# Patient Record
Sex: MALE | Race: BLACK OR AFRICAN AMERICAN | NOT HISPANIC OR LATINO | URBAN - METROPOLITAN AREA
[De-identification: names, ages, dates, MRNs, and addresses within clinical notes are randomized per-mention and may not be internally consistent; named-entity substitution may affect disease eponyms.]

---

## 2023-09-11 ENCOUNTER — EMERGENCY (EMERGENCY)
Facility: HOSPITAL | Age: 48
LOS: 1 days | Discharge: HOME | End: 2023-09-11
Attending: EMERGENCY MEDICINE | Admitting: EMERGENCY MEDICINE
Payer: COMMERCIAL

## 2023-09-11 VITALS
HEART RATE: 69 BPM | WEIGHT: 190.04 LBS | SYSTOLIC BLOOD PRESSURE: 109 MMHG | DIASTOLIC BLOOD PRESSURE: 71 MMHG | RESPIRATION RATE: 14 BRPM | HEIGHT: 65 IN | OXYGEN SATURATION: 96 % | TEMPERATURE: 100 F

## 2023-09-11 LAB
ALBUMIN SERPL ELPH-MCNC: 3.8 G/DL — SIGNIFICANT CHANGE UP (ref 3.3–5)
ALP SERPL-CCNC: 46 U/L — SIGNIFICANT CHANGE UP (ref 30–120)
ALT FLD-CCNC: 93 U/L — HIGH (ref 10–60)
ANION GAP SERPL CALC-SCNC: 10 MMOL/L — SIGNIFICANT CHANGE UP (ref 5–17)
APPEARANCE UR: CLEAR — SIGNIFICANT CHANGE UP
AST SERPL-CCNC: 92 U/L — HIGH (ref 10–40)
BASOPHILS # BLD AUTO: 0 K/UL — SIGNIFICANT CHANGE UP (ref 0–0.2)
BASOPHILS NFR BLD AUTO: 0 % — SIGNIFICANT CHANGE UP (ref 0–2)
BILIRUB SERPL-MCNC: 0.6 MG/DL — SIGNIFICANT CHANGE UP (ref 0.2–1.2)
BILIRUB UR-MCNC: NEGATIVE — SIGNIFICANT CHANGE UP
BUN SERPL-MCNC: 15 MG/DL — SIGNIFICANT CHANGE UP (ref 7–23)
CALCIUM SERPL-MCNC: 9.4 MG/DL — SIGNIFICANT CHANGE UP (ref 8.4–10.5)
CHLORIDE SERPL-SCNC: 101 MMOL/L — SIGNIFICANT CHANGE UP (ref 96–108)
CO2 SERPL-SCNC: 27 MMOL/L — SIGNIFICANT CHANGE UP (ref 22–31)
COLOR SPEC: YELLOW — SIGNIFICANT CHANGE UP
CREAT SERPL-MCNC: 1.33 MG/DL — HIGH (ref 0.5–1.3)
DIFF PNL FLD: NEGATIVE — SIGNIFICANT CHANGE UP
EGFR: 67 ML/MIN/1.73M2 — SIGNIFICANT CHANGE UP
EOSINOPHIL # BLD AUTO: 0 K/UL — SIGNIFICANT CHANGE UP (ref 0–0.5)
EOSINOPHIL NFR BLD AUTO: 0 % — SIGNIFICANT CHANGE UP (ref 0–6)
FLUAV AG NPH QL: SIGNIFICANT CHANGE UP
FLUBV AG NPH QL: SIGNIFICANT CHANGE UP
GLUCOSE SERPL-MCNC: 110 MG/DL — HIGH (ref 70–99)
GLUCOSE UR QL: NEGATIVE MG/DL — SIGNIFICANT CHANGE UP
HCT VFR BLD CALC: 45 % — SIGNIFICANT CHANGE UP (ref 39–50)
HGB BLD-MCNC: 14.3 G/DL — SIGNIFICANT CHANGE UP (ref 13–17)
KETONES UR-MCNC: 15 MG/DL
LEUKOCYTE ESTERASE UR-ACNC: NEGATIVE — SIGNIFICANT CHANGE UP
LIDOCAIN IGE QN: 30 U/L — SIGNIFICANT CHANGE UP (ref 16–77)
LYMPHOCYTES # BLD AUTO: 0.4 K/UL — LOW (ref 1–3.3)
LYMPHOCYTES # BLD AUTO: 16 % — SIGNIFICANT CHANGE UP (ref 13–44)
MANUAL SMEAR VERIFICATION: SIGNIFICANT CHANGE UP
MCHC RBC-ENTMCNC: 28.2 PG — SIGNIFICANT CHANGE UP (ref 27–34)
MCHC RBC-ENTMCNC: 31.8 GM/DL — LOW (ref 32–36)
MCV RBC AUTO: 88.8 FL — SIGNIFICANT CHANGE UP (ref 80–100)
MONOCYTES # BLD AUTO: 0.42 K/UL — SIGNIFICANT CHANGE UP (ref 0–0.9)
MONOCYTES NFR BLD AUTO: 17 % — HIGH (ref 2–14)
NEUTROPHILS # BLD AUTO: 1.66 K/UL — LOW (ref 1.8–7.4)
NEUTROPHILS NFR BLD AUTO: 67 % — SIGNIFICANT CHANGE UP (ref 43–77)
NITRITE UR-MCNC: NEGATIVE — SIGNIFICANT CHANGE UP
NRBC # BLD: 0 /100 — SIGNIFICANT CHANGE UP (ref 0–0)
NRBC # BLD: SIGNIFICANT CHANGE UP /100 WBCS (ref 0–0)
PH UR: 5 — SIGNIFICANT CHANGE UP (ref 5–8)
PLAT MORPH BLD: NORMAL — SIGNIFICANT CHANGE UP
PLATELET # BLD AUTO: 93 K/UL — LOW (ref 150–400)
PLATELET COUNT - ESTIMATE: ABNORMAL
POTASSIUM SERPL-MCNC: 3.8 MMOL/L — SIGNIFICANT CHANGE UP (ref 3.5–5.3)
POTASSIUM SERPL-SCNC: 3.8 MMOL/L — SIGNIFICANT CHANGE UP (ref 3.5–5.3)
PROT SERPL-MCNC: 7.8 G/DL — SIGNIFICANT CHANGE UP (ref 6–8.3)
PROT UR-MCNC: SIGNIFICANT CHANGE UP MG/DL
RBC # BLD: 5.07 M/UL — SIGNIFICANT CHANGE UP (ref 4.2–5.8)
RBC # FLD: 13.2 % — SIGNIFICANT CHANGE UP (ref 10.3–14.5)
RBC BLD AUTO: NORMAL — SIGNIFICANT CHANGE UP
RSV RNA NPH QL NAA+NON-PROBE: SIGNIFICANT CHANGE UP
SARS-COV-2 RNA SPEC QL NAA+PROBE: SIGNIFICANT CHANGE UP
SODIUM SERPL-SCNC: 138 MMOL/L — SIGNIFICANT CHANGE UP (ref 135–145)
SP GR SPEC: 1.1 — HIGH (ref 1–1.03)
TROPONIN I, HIGH SENSITIVITY RESULT: 11.3 NG/L — SIGNIFICANT CHANGE UP
UROBILINOGEN FLD QL: 1 MG/DL — SIGNIFICANT CHANGE UP (ref 0.2–1)
WBC # BLD: 2.48 K/UL — LOW (ref 3.8–10.5)
WBC # FLD AUTO: 2.48 K/UL — LOW (ref 3.8–10.5)

## 2023-09-11 PROCEDURE — 85025 COMPLETE CBC W/AUTO DIFF WBC: CPT

## 2023-09-11 PROCEDURE — 74177 CT ABD & PELVIS W/CONTRAST: CPT | Mod: 26,MA

## 2023-09-11 PROCEDURE — 99285 EMERGENCY DEPT VISIT HI MDM: CPT

## 2023-09-11 PROCEDURE — 93010 ELECTROCARDIOGRAM REPORT: CPT

## 2023-09-11 PROCEDURE — 93971 EXTREMITY STUDY: CPT | Mod: 26,LT

## 2023-09-11 PROCEDURE — 81003 URINALYSIS AUTO W/O SCOPE: CPT

## 2023-09-11 PROCEDURE — 93005 ELECTROCARDIOGRAM TRACING: CPT

## 2023-09-11 PROCEDURE — 74177 CT ABD & PELVIS W/CONTRAST: CPT | Mod: MA

## 2023-09-11 PROCEDURE — 87086 URINE CULTURE/COLONY COUNT: CPT

## 2023-09-11 PROCEDURE — 99285 EMERGENCY DEPT VISIT HI MDM: CPT | Mod: 25

## 2023-09-11 PROCEDURE — 84484 ASSAY OF TROPONIN QUANT: CPT

## 2023-09-11 PROCEDURE — 93971 EXTREMITY STUDY: CPT

## 2023-09-11 PROCEDURE — 87637 SARSCOV2&INF A&B&RSV AMP PRB: CPT

## 2023-09-11 PROCEDURE — 80053 COMPREHEN METABOLIC PANEL: CPT

## 2023-09-11 PROCEDURE — 36415 COLL VENOUS BLD VENIPUNCTURE: CPT

## 2023-09-11 PROCEDURE — 83690 ASSAY OF LIPASE: CPT

## 2023-09-11 PROCEDURE — 96361 HYDRATE IV INFUSION ADD-ON: CPT

## 2023-09-11 PROCEDURE — 96374 THER/PROPH/DIAG INJ IV PUSH: CPT | Mod: XU

## 2023-09-11 RX ORDER — SODIUM CHLORIDE 9 MG/ML
1000 INJECTION INTRAMUSCULAR; INTRAVENOUS; SUBCUTANEOUS ONCE
Refills: 0 | Status: COMPLETED | OUTPATIENT
Start: 2023-09-11 | End: 2023-09-11

## 2023-09-11 RX ORDER — KETOROLAC TROMETHAMINE 30 MG/ML
30 SYRINGE (ML) INJECTION ONCE
Refills: 0 | Status: DISCONTINUED | OUTPATIENT
Start: 2023-09-11 | End: 2023-09-11

## 2023-09-11 RX ORDER — ONDANSETRON 8 MG/1
4 TABLET, FILM COATED ORAL ONCE
Refills: 0 | Status: COMPLETED | OUTPATIENT
Start: 2023-09-11 | End: 2023-09-11

## 2023-09-11 RX ADMIN — Medication 30 MILLIGRAM(S): at 12:40

## 2023-09-11 RX ADMIN — SODIUM CHLORIDE 1000 MILLILITER(S): 9 INJECTION INTRAMUSCULAR; INTRAVENOUS; SUBCUTANEOUS at 12:25

## 2023-09-11 RX ADMIN — SODIUM CHLORIDE 1000 MILLILITER(S): 9 INJECTION INTRAMUSCULAR; INTRAVENOUS; SUBCUTANEOUS at 13:00

## 2023-09-11 RX ADMIN — SODIUM CHLORIDE 1000 MILLILITER(S): 9 INJECTION INTRAMUSCULAR; INTRAVENOUS; SUBCUTANEOUS at 12:00

## 2023-09-11 RX ADMIN — Medication 30 MILLIGRAM(S): at 12:25

## 2023-09-11 RX ADMIN — SODIUM CHLORIDE 1000 MILLILITER(S): 9 INJECTION INTRAMUSCULAR; INTRAVENOUS; SUBCUTANEOUS at 13:25

## 2023-09-11 NOTE — ED ADULT NURSE NOTE - NSFALLUNIVINTERV_ED_ALL_ED
Bed/Stretcher in lowest position, wheels locked, appropriate side rails in place/Call bell, personal items and telephone in reach/Instruct patient to call for assistance before getting out of bed/chair/stretcher/Non-slip footwear applied when patient is off stretcher/[place] to call system/Physically safe environment - no spills, clutter or unnecessary equipment/Purposeful proactive rounding/Room/bathroom lighting operational, light cord in reach

## 2023-09-11 NOTE — ED PROVIDER NOTE - DIFFERENTIAL DIAGNOSIS
Differential including but not limited to flu COVID or other respiratory virus gastritis colitis enteritis diverticulitis electrolyte abnormality dehydration Differential Diagnosis

## 2023-09-11 NOTE — ED PROVIDER NOTE - CLINICAL SUMMARY MEDICAL DECISION MAKING FREE TEXT BOX
Patient complaining of body aches headache abdominal pain nausea and diarrhea which began 2 days ago he was traveling in [place].  Patient denies fevers chills chest pain short breath cough vomiting urinary complaints.  Patient reports left calf pain.    Plan EKG CT abdomen pelvis left lower extremity Doppler IV fluids Toradol Zofran    Differential including but not limited to flu COVID or other respiratory virus gastritis colitis enteritis diverticulitis electrolyte abnormality dehydration

## 2023-09-11 NOTE — ED PROVIDER NOTE - PATIENT PORTAL LINK FT
You can access the FollowMyHealth Patient Portal offered by [facility] by registering at the following website: [email or web address]. By joining [employer]’s FollowMyHealth portal, you will also be able to view your health information using other applications (apps) compatible with our system.

## 2023-09-11 NOTE — ED ADULT NURSE NOTE - OBJECTIVE STATEMENT
49yo male walked into ED, pt c/o full body aches and abd pain "it all started while I was in [place]". pt denies fever, SOB.

## 2023-09-11 NOTE — ED PROVIDER NOTE - OBJECTIVE STATEMENT
Patient complaining of body aches headache abdominal pain nausea and diarrhea which began 2 days ago he was traveling in [place].  Patient denies fevers chills chest pain short breath cough vomiting urinary complaints.  Patient reports left calf pain. Patient complaining of body aches headache abdominal pain nausea and diarrhea which began 2 days ago he was traveling in [place].  Patient denies fevers chills chest pain short breath cough vomiting urinary complaints.  Patient reports left calf pain.  pmd forgot anme

## 2023-09-11 NOTE — ED PROVIDER NOTE - NSFOLLOWUPINSTRUCTIONS_ED_ALL_ED_FT
Abdominal Pain, Adult  Pain in the abdomen (abdominal pain) can be caused by many things. Often, abdominal pain is not serious and it gets better with no treatment or by being treated at home. However, sometimes abdominal pain is serious.    Your health care provider will ask questions about your medical history and do a physical exam to try to determine the cause of your abdominal pain.    Follow these instructions at home:  Medicines    Take over-the-counter and prescription medicines only as told by your health care provider.  Do not take a laxative unless told by your health care provider.  General instructions      Watch your condition for any changes.  Drink enough fluid to keep your urine pale yellow.  Keep all follow-up visits as told by your health care provider. This is important.  Contact a health care provider if:  Your abdominal pain changes or gets worse.  You are not hungry or you lose weight without trying.  You are constipated or have diarrhea for more than 2–3 days.  You have pain when you urinate or have a bowel movement.  Your abdominal pain wakes you up at night.  Your pain gets worse with meals, after eating, or with certain foods.  You are vomiting and cannot keep anything down.  You have a fever.  You have blood in your urine.  Get help right away if:  Your pain does not go away as soon as your health care provider told you to expect.  You cannot stop vomiting.  Your pain is only in areas of the abdomen, such as the right side or the left lower portion of the abdomen. Pain on the right side could be caused by appendicitis.  You have bloody or black stools, or stools that look like tar.  You have severe pain, cramping, or bloating in your abdomen.  You have signs of dehydration, such as:  Dark urine, very little urine, or no urine.  Cracked lips.  Dry mouth.  Sunken eyes.  Sleepiness.  Weakness.  You have trouble breathing or chest pain.  Summary  Often, abdominal pain is not serious and it gets better with no treatment or by being treated at home. However, sometimes abdominal pain is serious.  Watch your condition for any changes.  Take over-the-counter and prescription medicines only as told by your health care provider.  Contact a health care provider if your abdominal pain changes or gets worse.  Get help right away if you have severe pain, cramping, or bloating in your abdomen.  This information is not intended to replace advice given to you by your health care provider. Make sure you discuss any questions you have with your health care provider.    Muscle Pain, Adult  Muscle pain, also called myalgia, is a condition in which a person has pain in one or more muscles in the body. Muscle pain may be mild, moderate, or severe. It may feel sharp, achy, or burning. In most cases, the pain lasts only a short time and goes away without treatment.    Muscle pain can result from using muscles in a new or different way or after a period of inactivity. It is normal to feel some muscle pain after starting an exercise program. Muscles that have not been used often will be sore at first.    What are the causes?  This condition is caused by using muscles in a new or different way after a period of inactivity. Other causes may include:  Overuse or muscle strain, especially if you are not in shape. This is the most common cause of muscle pain.  Injury or bruising.  Infectious diseases, including diseases caused by viruses, such as the flu (influenza).  Fibromyalgia.This is a long-term, or chronic, condition that causes muscle tenderness, tiredness (fatigue), and headache.  Autoimmune or rheumatologic diseases. These are conditions, such as lupus, in which the body's defense system (immunesystem) attacks areas in the body.  Certain medicines, including ACE inhibitors and statins.  What are the signs or symptoms?  The main symptom of this condition is sore or painful muscles, including during activity and when stretching. You may also have slight swelling.    How is this diagnosed?  This condition is diagnosed with a physical exam. Your health care provider will ask questions about your pain and when it began. If you have not had muscle pain for very long, your health care provider may want to wait before doing much testing. If your muscle pain has lasted a long time, tests may be done right away. In some cases, this may include tests to rule out certain conditions or illnesses.    How is this treated?  Treatment for this condition depends on the cause. Home care is often enough to relieve muscle pain. Your health care provider may also prescribe NSAIDs, such as ibuprofen.    Follow these instructions at home:  Medicines    Take over-the-counter and prescription medicines only as told by your health care provider.  Ask your health care provider if the medicine prescribed to you requires you to avoid driving or using machinery.  Managing pain, swelling, and discomfort    Bag of ice on a towel on the skin.  A heating pad for use on the affected area.  If directed, put ice on the painful area. To do this:  Put ice in a plastic bag.  Place a towel between your skin and the bag.  Leave the ice on for 20 minutes, 2–3 times a day.  For the first 2 days of muscle soreness, or if there is swelling:  Do not soak in hot baths.  Do not use a hot tub, steam room, sauna, heating pad, or other heat source.  After 48–72 hours, you may alternate between applying ice and applying heat as told by your health care provider. If directed, apply heat to the affected area as often as told by your health care provider. Use the heat source that your health care provider recommends, such as a moist heat pack or a heating pad.  Place a towel between your skin and the heat source.  Leave the heat on for 20–30 minutes.  Remove the heat if your skin turns bright red. This is especially important if you are unable to feel pain, heat, or cold. You may have a greater risk of getting burned.  If you have an injury, raise (elevate) the injured area above the level of your heart while you are sitting or lying down.  Activity    A person standing with arms stretched straight out in front and then squatting while keeping the knees over the toes.  If overuse is causing your muscle pain:  Slow down your activities until the pain goes away.  Do regular, gentle exercises if you are not usually active.  Warm up before exercising. Stretch before and after exercising. This can help lower the risk of muscle pain.  Do not continue working out if the pain is severe. Severe pain could mean that you have injured a muscle.  Do not lift anything that is heavier than 5–10 lb (2.3–4.5 kg), or the limit that you are told, until your health care provider says that it is safe.  Return to your normal activities as told by your health care provider. Ask your health care provider what activities are safe for you.  General instructions    Do not use any products that contain nicotine or tobacco, such as cigarettes, e-cigarettes, and chewing tobacco. These can delay healing. If you need help quitting, ask your health care provider.  Keep all follow-up visits as told by your health care provider. This is important.  Contact a health care provider if you have:  Muscle pain that gets worse and medicines do not help.  Muscle pain that lasts longer than 3 days.  A rash or fever along with muscle pain.  Muscle pain after a tick bite.  Muscle pain while working out, even though you are in good physical condition.  Redness, soreness, or swelling along with muscle pain.  Muscle pain after starting a new medicine or changing the dose of a medicine.  Get help right away if you have:  Trouble breathing.  Trouble swallowing.  Muscle pain along with a stiff neck, fever, and vomiting.  Severe muscle weakness or you cannot move part of your body.  These symptoms may represent a serious problem that is an emergency. Do not wait to see if the symptoms will go away. Get medical help right away. Call your local emergency services (911 in the [place] Do not drive yourself to the hospital.    Summary  Muscle pain usually lasts only a short time and goes away without treatment.  This condition is caused by using muscles in a new or different way after a period of inactivity.  If your muscle pain lasts longer than 3 days, tell your health care provider.  This information is not intended to replace advice given to you by your health care provider. Make sure you discuss any questions you have with your health care provider.

## 2023-09-11 NOTE — ED ADULT TRIAGE NOTE - GLASGOW COMA SCALE: BEST MOTOR RESPONSE, MLM
RASHEEDA Lundberg    Patient states he has difficulty breathing for the past week or so  Pt states this happens every year when the seasons change  Patient states he gets walking pneumonia  Denies fever  Denies fatigue  Patient states he is eating well  Patient (M6) obeys commands

## 2023-09-12 LAB
CULTURE RESULTS: SIGNIFICANT CHANGE UP
SPECIMEN SOURCE: SIGNIFICANT CHANGE UP

## 2023-12-22 ENCOUNTER — EMERGENCY (EMERGENCY)
Facility: HOSPITAL | Age: 48
LOS: 1 days | Discharge: HOME | End: 2023-12-22
Attending: EMERGENCY MEDICINE | Admitting: EMERGENCY MEDICINE
Payer: COMMERCIAL

## 2023-12-22 VITALS
HEART RATE: 62 BPM | SYSTOLIC BLOOD PRESSURE: 118 MMHG | DIASTOLIC BLOOD PRESSURE: 76 MMHG | RESPIRATION RATE: 18 BRPM | OXYGEN SATURATION: 98 % | TEMPERATURE: 98 F

## 2023-12-22 VITALS
WEIGHT: 192.9 LBS | DIASTOLIC BLOOD PRESSURE: 68 MMHG | HEIGHT: 65 IN | HEART RATE: 71 BPM | OXYGEN SATURATION: 98 % | SYSTOLIC BLOOD PRESSURE: 122 MMHG | TEMPERATURE: 99 F | RESPIRATION RATE: 15 BRPM

## 2023-12-22 PROBLEM — Z78.9 OTHER SPECIFIED HEALTH STATUS: Chronic | Status: ACTIVE | Noted: 2023-09-11

## 2023-12-22 LAB
FLUAV AG NPH QL: SIGNIFICANT CHANGE UP
FLUAV AG NPH QL: SIGNIFICANT CHANGE UP
FLUBV AG NPH QL: SIGNIFICANT CHANGE UP
FLUBV AG NPH QL: SIGNIFICANT CHANGE UP
RSV RNA NPH QL NAA+NON-PROBE: SIGNIFICANT CHANGE UP
RSV RNA NPH QL NAA+NON-PROBE: SIGNIFICANT CHANGE UP
S PYO DNA THROAT QL NAA+PROBE: SIGNIFICANT CHANGE UP
S PYO DNA THROAT QL NAA+PROBE: SIGNIFICANT CHANGE UP
SARS-COV-2 RNA SPEC QL NAA+PROBE: SIGNIFICANT CHANGE UP
SARS-COV-2 RNA SPEC QL NAA+PROBE: SIGNIFICANT CHANGE UP

## 2023-12-22 PROCEDURE — 87637 SARSCOV2&INF A&B&RSV AMP PRB: CPT

## 2023-12-22 PROCEDURE — 96372 THER/PROPH/DIAG INJ SC/IM: CPT

## 2023-12-22 PROCEDURE — 99283 EMERGENCY DEPT VISIT LOW MDM: CPT

## 2023-12-22 PROCEDURE — 87651 STREP A DNA AMP PROBE: CPT

## 2023-12-22 PROCEDURE — 99284 EMERGENCY DEPT VISIT MOD MDM: CPT

## 2023-12-22 PROCEDURE — 87798 DETECT AGENT NOS DNA AMP: CPT

## 2023-12-22 RX ORDER — KETOROLAC TROMETHAMINE 30 MG/ML
30 SYRINGE (ML) INJECTION ONCE
Refills: 0 | Status: DISCONTINUED | OUTPATIENT
Start: 2023-12-22 | End: 2023-12-22

## 2023-12-22 RX ADMIN — Medication 30 MILLIGRAM(S): at 18:20

## 2023-12-22 NOTE — ED PROVIDER NOTE - CARE PROVIDER_API CALL
Kemal Pascal  Otolaryngology  [address], [address]  [place] [address]  Phone: [phone]  Fax: [phone]  Follow Up Time: 1-3 Days   Kemal Pascal  Otolaryngology  [address]  [place] [address]  Phone: [phone]  Fax: [phone]  Follow Up Time: 1-3 Days

## 2023-12-22 NOTE — ED ADULT NURSE NOTE - NSFALLUNIVINTERV_ED_ALL_ED
Bed/Stretcher in lowest position, wheels locked, appropriate side rails in place/Call bell, personal items and telephone in reach/Instruct patient to call for assistance before getting out of bed/chair/stretcher/Non-slip footwear applied when patient is off stretcher/[place] to call system/Physically safe environment - no spills, clutter or unnecessary equipment/Purposeful proactive rounding/Room/bathroom lighting operational, light cord in reach Bed/Stretcher in lowest position, wheels locked, appropriate side rails in place/Call bell, personal items and telephone in reach/Instruct patient to call for assistance before getting out of bed/chair/stretcher/Non-slip footwear applied when patient is off stretcher/[place] to call system/Physically safe environment - no spills, clutter or unnecessary equipment/Purposeful proactive rounding/Room/bathroom lighting operational, light cord in reach

## 2023-12-22 NOTE — ED PROVIDER NOTE - CLINICAL SUMMARY MEDICAL DECISION MAKING FREE TEXT BOX
48-year-old male with no significant past medical history presents with has been having some sore throat, mild cough over past 4 days.  No known fever or chills.  No neck stiffness or photophobia.  No acute headache.  No abdominal pain.  No vomiting or diarrhea.  No recent travel or immobilization.  No known sick contacts.  Patient previously vaccinated for COVID, not 2023 vaccinated for COVID and flu.  No aggravating or alleviating factors otherwise noted.  No other acute injury or complaints.  Exam: Nontoxic, well-appearing.  Normal respiratory effort.  Mild pharyngeal erythema, no acute edema.  Some anterior cervical lymphadenopathy.  CTA BL, no W/R/R.  Normal cardiac exam.  Abdomen soft, nontender, nondistended.  No HSM.  No CVAT.  Nonfocal neuroexam.  No other acute findings on exam.  Acute URI symptoms with sore throat.  Will check flu/COVID, strep, outpatient follow-up

## 2023-12-22 NOTE — ED PROVIDER NOTE - PATIENT PORTAL LINK FT
You can access the FollowMyHealth Patient Portal offered by [facility] by registering at the following website: [email or web address]. By joining [employer]’s FollowMyHealth portal, you will also be able to view your health information using other applications (apps) compatible with our system. You can access the FollowMyHealth Patient Portal offered by [facility] by registering at the following website: [email or web address]. By joining [employer]’s FollowMyHealth portal, you will also be able to view your health information using other applications (apps) compatible with our system.

## 2023-12-22 NOTE — ED PROVIDER NOTE - OBJECTIVE STATEMENT
49 yo male with no past medical history presents to the ED complaining of throat pain and pain with swallowing for the past 4 days.  Associated with nonproductive cough.  Patient's mother with similar symptoms.  Patient tolerating p.o. however with discomfort.  Patient tried taking Tylenol earlier today with minimal relief.  Denies fever, chills, headache, dizziness, neck pain/stiffness, dysphagia, drooling, change in voice, chest pain, shortness of breath, abdominal pain, nausea, vomiting, upper or lower extremity weakness or paresthesias.

## 2023-12-22 NOTE — ED PROVIDER NOTE - THROAT FINDINGS
+ mild posterior pharynx and tonsil erythema. no exudate, uvula midline, no abscess./no exudate/NO DROOLING/NO TONGUE ELEVATION/NO STRIDOR

## 2023-12-22 NOTE — ED ADULT NURSE NOTE - OBJECTIVE STATEMENT
patient A&Ox3 in no acute distress c/o of ears pain and sore throat pain with swallowing denied difficulty breathing able to swallow but " very painful" as per md started 4 days ago

## 2023-12-22 NOTE — ED PROVIDER NOTE - PROVIDER TOKENS
PROVIDER:[TOKEN:[[id number]],FOLLOWUP:[1-3 Days]] PROVIDER:[TOKEN:[[id number]],FOLLOWUP:[1-3 Days]]

## 2025-03-17 ENCOUNTER — EMERGENCY (EMERGENCY)
Facility: HOSPITAL | Age: 50
LOS: 1 days | Discharge: HOME | End: 2025-03-17
Attending: EMERGENCY MEDICINE | Admitting: EMERGENCY MEDICINE
Payer: COMMERCIAL

## 2025-03-17 VITALS
RESPIRATION RATE: 18 BRPM | WEIGHT: 196.21 LBS | DIASTOLIC BLOOD PRESSURE: 67 MMHG | TEMPERATURE: 98 F | HEART RATE: 67 BPM | OXYGEN SATURATION: 99 % | SYSTOLIC BLOOD PRESSURE: 146 MMHG | HEIGHT: 69 IN

## 2025-03-17 VITALS
SYSTOLIC BLOOD PRESSURE: 107 MMHG | RESPIRATION RATE: 16 BRPM | OXYGEN SATURATION: 100 % | DIASTOLIC BLOOD PRESSURE: 62 MMHG | HEART RATE: 55 BPM

## 2025-03-17 LAB
ALBUMIN SERPL ELPH-MCNC: 3.8 G/DL — SIGNIFICANT CHANGE UP (ref 3.3–5)
ALP SERPL-CCNC: 47 U/L — SIGNIFICANT CHANGE UP (ref 30–120)
ALT FLD-CCNC: 28 U/L — SIGNIFICANT CHANGE UP (ref 10–60)
ANION GAP SERPL CALC-SCNC: 5 MMOL/L — SIGNIFICANT CHANGE UP (ref 5–17)
AST SERPL-CCNC: 21 U/L — SIGNIFICANT CHANGE UP (ref 10–40)
BASOPHILS # BLD AUTO: 0.02 K/UL — SIGNIFICANT CHANGE UP (ref 0–0.2)
BASOPHILS NFR BLD AUTO: 0.4 % — SIGNIFICANT CHANGE UP (ref 0–2)
BILIRUB SERPL-MCNC: 1.1 MG/DL — SIGNIFICANT CHANGE UP (ref 0.2–1.2)
BUN SERPL-MCNC: 8 MG/DL — SIGNIFICANT CHANGE UP (ref 7–23)
CALCIUM SERPL-MCNC: 9 MG/DL — SIGNIFICANT CHANGE UP (ref 8.4–10.5)
CHLORIDE SERPL-SCNC: 105 MMOL/L — SIGNIFICANT CHANGE UP (ref 96–108)
CO2 SERPL-SCNC: 30 MMOL/L — SIGNIFICANT CHANGE UP (ref 22–31)
CREAT SERPL-MCNC: 0.79 MG/DL — SIGNIFICANT CHANGE UP (ref 0.5–1.3)
D DIMER BLD IA.RAPID-MCNC: 236 NG/ML DDU — HIGH
EGFR: 110 ML/MIN/1.73M2 — SIGNIFICANT CHANGE UP
EGFR: 110 ML/MIN/1.73M2 — SIGNIFICANT CHANGE UP
EOSINOPHIL # BLD AUTO: 0.04 K/UL — SIGNIFICANT CHANGE UP (ref 0–0.5)
EOSINOPHIL NFR BLD AUTO: 0.9 % — SIGNIFICANT CHANGE UP (ref 0–6)
GLUCOSE SERPL-MCNC: 103 MG/DL — HIGH (ref 70–99)
HCT VFR BLD CALC: 40.7 % — SIGNIFICANT CHANGE UP (ref 39–50)
HGB BLD-MCNC: 13.4 G/DL — SIGNIFICANT CHANGE UP (ref 13–17)
IMM GRANULOCYTES NFR BLD AUTO: 0 % — SIGNIFICANT CHANGE UP (ref 0–0.9)
LYMPHOCYTES # BLD AUTO: 1.63 K/UL — SIGNIFICANT CHANGE UP (ref 1–3.3)
LYMPHOCYTES # BLD AUTO: 36.1 % — SIGNIFICANT CHANGE UP (ref 13–44)
MCHC RBC-ENTMCNC: 29.4 PG — SIGNIFICANT CHANGE UP (ref 27–34)
MCHC RBC-ENTMCNC: 32.9 G/DL — SIGNIFICANT CHANGE UP (ref 32–36)
MCV RBC AUTO: 89.3 FL — SIGNIFICANT CHANGE UP (ref 80–100)
MONOCYTES # BLD AUTO: 0.38 K/UL — SIGNIFICANT CHANGE UP (ref 0–0.9)
MONOCYTES NFR BLD AUTO: 8.4 % — SIGNIFICANT CHANGE UP (ref 2–14)
NEUTROPHILS # BLD AUTO: 2.45 K/UL — SIGNIFICANT CHANGE UP (ref 1.8–7.4)
NEUTROPHILS NFR BLD AUTO: 54.2 % — SIGNIFICANT CHANGE UP (ref 43–77)
NRBC BLD AUTO-RTO: 0 /100 WBCS — SIGNIFICANT CHANGE UP (ref 0–0)
PLATELET # BLD AUTO: 174 K/UL — SIGNIFICANT CHANGE UP (ref 150–400)
POTASSIUM SERPL-MCNC: 3.6 MMOL/L — SIGNIFICANT CHANGE UP (ref 3.5–5.3)
POTASSIUM SERPL-SCNC: 3.6 MMOL/L — SIGNIFICANT CHANGE UP (ref 3.5–5.3)
PROT SERPL-MCNC: 7.3 G/DL — SIGNIFICANT CHANGE UP (ref 6–8.3)
RBC # BLD: 4.56 M/UL — SIGNIFICANT CHANGE UP (ref 4.2–5.8)
RBC # FLD: 13.2 % — SIGNIFICANT CHANGE UP (ref 10.3–14.5)
SODIUM SERPL-SCNC: 140 MMOL/L — SIGNIFICANT CHANGE UP (ref 135–145)
TROPONIN I, HIGH SENSITIVITY RESULT: 5.4 NG/L — SIGNIFICANT CHANGE UP
WBC # BLD: 4.52 K/UL — SIGNIFICANT CHANGE UP (ref 3.8–10.5)
WBC # FLD AUTO: 4.52 K/UL — SIGNIFICANT CHANGE UP (ref 3.8–10.5)

## 2025-03-17 PROCEDURE — 36415 COLL VENOUS BLD VENIPUNCTURE: CPT

## 2025-03-17 PROCEDURE — 84484 ASSAY OF TROPONIN QUANT: CPT

## 2025-03-17 PROCEDURE — 85379 FIBRIN DEGRADATION QUANT: CPT

## 2025-03-17 PROCEDURE — 99285 EMERGENCY DEPT VISIT HI MDM: CPT | Mod: 25

## 2025-03-17 PROCEDURE — 71045 X-RAY EXAM CHEST 1 VIEW: CPT

## 2025-03-17 PROCEDURE — 71045 X-RAY EXAM CHEST 1 VIEW: CPT | Mod: 26

## 2025-03-17 PROCEDURE — 99285 EMERGENCY DEPT VISIT HI MDM: CPT

## 2025-03-17 PROCEDURE — 93010 ELECTROCARDIOGRAM REPORT: CPT

## 2025-03-17 PROCEDURE — 85025 COMPLETE CBC W/AUTO DIFF WBC: CPT

## 2025-03-17 PROCEDURE — 93005 ELECTROCARDIOGRAM TRACING: CPT

## 2025-03-17 PROCEDURE — 71275 CT ANGIOGRAPHY CHEST: CPT | Mod: 26

## 2025-03-17 PROCEDURE — 71275 CT ANGIOGRAPHY CHEST: CPT | Mod: MC

## 2025-03-17 PROCEDURE — 80053 COMPREHEN METABOLIC PANEL: CPT

## 2025-03-17 NOTE — ED PROVIDER NOTE - CLINICAL SUMMARY MEDICAL DECISION MAKING FREE TEXT BOX
49y M with brief episode cp tonight, asymptomatic here, pain started 2d ago, possibly stress related, will r/o acs/pe, iv, labs, ekg, cxr

## 2025-03-17 NOTE — ED PROVIDER NOTE - PATIENT PORTAL LINK FT
You can access the FollowMyHealth Patient Portal offered by [facility] by registering at the following website: [email or web address]. By joining [employer]’s FollowMyHealth portal, you will also be able to view your health information using other applications (apps) compatible with our system.

## 2025-03-17 NOTE — ED PROVIDER NOTE - CARE PROVIDER_API CALL
Follow Up With Your Primary Care Provider and bring a copy of your results as there are incidental findings on CT scan that may require follow up testing,   Phone: (   )    -  Fax: (   )    -  Follow Up Time:     Mikie Nielsen  Cardiovascular Disease  [address], [address]  [place] [address]  Phone: [phone]  Fax: [phone]  Follow Up Time: 1-3 Days

## 2025-03-17 NOTE — ED ADULT NURSE NOTE - SUICIDE SCREENING QUESTION 3
Pt is made aware of below information. Verbalized understanding and in agreement with plan.     Lab orders placed.    No

## 2025-03-17 NOTE — ED PROVIDER NOTE - PROGRESS NOTE DETAILS
discussed results with pt including incidental CT findings, advise pt to bring copy of report to PCP for follow up and will refer for cardiology outpatient follow up as well

## 2025-03-17 NOTE — ED PROVIDER NOTE - NSFOLLOWUPCLINICS_GEN_ALL_ED_FT
Cardiology at [facility]  Cardiology  [address] [place] [address]  Phone: [phone]  Fax:   Follow Up Time: 1-3 Days

## 2025-03-17 NOTE — ED PROVIDER NOTE - CARE PROVIDERS DIRECT ADDRESSES
,DirectAddress_Unknown,[email or web address]

## 2025-03-17 NOTE — ED PROVIDER NOTE - PROVIDER TOKENS
FREE:[LAST:[Follow Up With Your Primary Care Provider and bring a copy of your results as there are incidental findings on CT scan that may require follow up testing],PHONE:[(   )    -],FAX:[(   )    -]],PROVIDER:[TOKEN:[[id number]],FOLLOWUP:[1-3 Days]]

## 2025-03-17 NOTE — ED PROVIDER NOTE - OBJECTIVE STATEMENT
49y M c/o cp intermittent for 3d, says it's hard to describe, feels like a pressure or tightness across the chest, around 2am tonight was trying to rest, was half asleep when he felt the discomfort again, lasted about 2 min, may have had mild nausea, no vomiting, no abd pain, no sob, no palpitations, had URI about 3 wks ago, has had some mild residual dry cough still, no pain with movement, pt also mentions a stressful issue in his life that started a couple of days ago and says it might be when he thinks about this issue that the discomfort comes on, pt denies si/hi/hallucinations, has not taken anything for discomfort, right now is asymptomatic

## 2025-03-17 NOTE — ED ADULT NURSE NOTE - CHPI ED NUR SYMPTOMS NEG
no back pain/no congestion/no dizziness/no fever/no nausea/no shortness of breath/no syncope/no vomiting

## 2025-06-20 NOTE — ED ADULT NURSE NOTE - NS ED NURSE RECORD ANOTHER HT AND WT
Care Transitions Note    Initial Call - Call within 2 business days of discharge: Yes    Patient Current Location:  Home: [address] [place] [id number]    Care Transition Nurse contacted the patient by telephone to perform post hospital discharge assessment, verified name and  as identifiers.  Provided introduction to self, and explanation of the Care Transition Nurse role.    Patient: Monalisa Mcdowell      Patient : 1941   MRN: [id number]      Reason for Admission: small left SDH, S/P mechanical fall   Discharge Date: 25    RURS: Readmission Risk Score: 13.8      - STV admission after transfer from  ED for small left SDH after fall backwards in bathtub.  Hx  shunt.  Spinal fracture - chronic and acute, back brace  Being treated for UTI with keflex which was started prior to admission.  NS referral - continue to hold eliquis. Hx AF       Last Discharge Facility       Date Complaint Diagnosis Description Type Department Provider    25 Fall SDH (subdural hematoma) (HCC) ED to Hosp-Admission (Discharged) (ADMITTED) [facility] 2C Sarah Hameed MD; Bru...    25 Fall Injury of head, initial encounter ... ED (TRANSFER) [facility] ED Zan Lua, DO            Was this an external facility discharge? No    Additional needs identified to be addressed with provider   High priority: Monalisa has HFU appt with you on Monday,  and would like Home Care with PT referral and also will need to review medications - she will bring her discharge paperwork med list to the appt which is confusing.    The neuro notes in the chart keep referring to stopping eliquis which I could never see documentation that she was ever on?  She told me she was on it because of her AF, but she told me she doesn't have cardiologist. Bottom line is that she is not taking now, but if she is to resume it, doesn't have direction as to when.  I entered the 1111F for what she said she is actually taking now, but 
Yes